# Patient Record
Sex: MALE | Race: WHITE | NOT HISPANIC OR LATINO | Employment: FULL TIME | URBAN - METROPOLITAN AREA
[De-identification: names, ages, dates, MRNs, and addresses within clinical notes are randomized per-mention and may not be internally consistent; named-entity substitution may affect disease eponyms.]

---

## 2017-09-24 ENCOUNTER — HOSPITAL ENCOUNTER (EMERGENCY)
Facility: HOSPITAL | Age: 37
Discharge: HOME | End: 2017-09-24
Attending: EMERGENCY MEDICINE | Admitting: EMERGENCY MEDICINE

## 2017-09-24 ENCOUNTER — APPOINTMENT (OUTPATIENT)
Dept: GENERAL RADIOLOGY | Facility: HOSPITAL | Age: 37
End: 2017-09-24

## 2017-09-24 VITALS
BODY MASS INDEX: 28.88 KG/M2 | OXYGEN SATURATION: 98 % | WEIGHT: 225 LBS | SYSTOLIC BLOOD PRESSURE: 114 MMHG | RESPIRATION RATE: 16 BRPM | HEIGHT: 74 IN | HEART RATE: 74 BPM | TEMPERATURE: 98.2 F | DIASTOLIC BLOOD PRESSURE: 74 MMHG

## 2017-09-24 DIAGNOSIS — S61.219A LACERATION OF FINGER, INITIAL ENCOUNTER: Primary | ICD-10-CM

## 2017-09-24 DIAGNOSIS — S62.639B OPEN FRACTURE OF TUFT OF DISTAL PHALANX OF FINGER, INITIAL ENCOUNTER: ICD-10-CM

## 2017-09-24 PROCEDURE — 73140 X-RAY EXAM OF FINGER(S): CPT

## 2017-09-24 PROCEDURE — 99283 EMERGENCY DEPT VISIT LOW MDM: CPT

## 2017-09-24 RX ORDER — HYDROCODONE BITARTRATE AND ACETAMINOPHEN 5; 325 MG/1; MG/1
1 TABLET ORAL EVERY 6 HOURS PRN
Qty: 12 TABLET | Refills: 0 | Status: SHIPPED | OUTPATIENT
Start: 2017-09-24

## 2017-09-24 RX ORDER — CEPHALEXIN 500 MG/1
500 CAPSULE ORAL 4 TIMES DAILY
Qty: 40 CAPSULE | Refills: 0 | Status: SHIPPED | OUTPATIENT
Start: 2017-09-24

## 2017-09-24 RX ORDER — BUPIVACAINE HYDROCHLORIDE 5 MG/ML
10 INJECTION, SOLUTION EPIDURAL; INTRACAUDAL ONCE
Status: COMPLETED | OUTPATIENT
Start: 2017-09-24 | End: 2017-09-24

## 2017-09-24 RX ORDER — LIDOCAINE HYDROCHLORIDE 10 MG/ML
10 INJECTION, SOLUTION EPIDURAL; INFILTRATION; INTRACAUDAL; PERINEURAL ONCE
Status: COMPLETED | OUTPATIENT
Start: 2017-09-24 | End: 2017-09-24

## 2017-09-24 RX ADMIN — LIDOCAINE HYDROCHLORIDE 10 ML: 10 INJECTION, SOLUTION EPIDURAL; INFILTRATION; INTRACAUDAL; PERINEURAL at 15:39

## 2017-09-24 RX ADMIN — BUPIVACAINE HYDROCHLORIDE 10 ML: 5 INJECTION, SOLUTION EPIDURAL; INTRACAUDAL at 15:39

## 2017-09-24 NOTE — ED PROVIDER NOTES
Subjective   HPI Comments: Patient is a 37-year-old white male that presents emergency Department complaints of a left third digit laceration.  Patient explains he had stopped the , and caught his digit tip on the blade.  Pt is right handed.  Pt is here with his spouse.  Pt advises that he is retired .   Pt has laceration through the 3rd digit nailbed, and posterior fingertip.  Pt c/o pain with movement and palp.  Pt has full ROM of the digit.  Pt tetanus is UTD.    Patient is a 37 y.o. male presenting with upper extremity pain.   History provided by:  Patient  Upper Extremity Issue   Upper extremity pain location: Lt 3rd finger laceration.  Injury: yes    Time since incident: Just PTA.  Mechanism of injury comment:  Cut digit on   Pain details:     Quality:  Throbbing    Severity:  Moderate    Timing:  Constant    Progression:  Worsening      Review of Systems   Musculoskeletal:        3rd middle finger laceration   Skin: Positive for wound.   All other systems reviewed and are negative.      Past Medical History:   Diagnosis Date   • PTSD (post-traumatic stress disorder)        No Known Allergies    Past Surgical History:   Procedure Laterality Date   • HERNIA REPAIR         History reviewed. No pertinent family history.    Social History     Social History   • Marital status:      Spouse name: N/A   • Number of children: N/A   • Years of education: N/A     Social History Main Topics   • Smoking status: Former Smoker   • Smokeless tobacco: Never Used   • Alcohol use No   • Drug use: No   • Sexual activity: Defer     Other Topics Concern   • None     Social History Narrative   • None           Objective   Physical Exam   Constitutional: He appears well-developed and well-nourished. He appears distressed (pt is uncomfortable, anxious at this time.).   HENT:   Head: Normocephalic and atraumatic.   Eyes: Conjunctivae are normal.   Neck: Normal range of motion.   Cardiovascular:  Normal rate, regular rhythm and normal heart sounds.    Pulmonary/Chest: Effort normal and breath sounds normal. No respiratory distress.   Musculoskeletal:        Hands:  Neurological: He is alert.   Skin: He is diaphoretic.   Nursing note and vitals reviewed.      Laceration Repair  Date/Time: 9/24/2017 11:44 PM  Performed by: MANE BEVERLY  Authorized by: NESTOR GUILLEN   Consent: Verbal consent obtained.  Risks and benefits: risks, benefits and alternatives were discussed  Consent given by: patient  Patient identity confirmed: verbally with patient  Location: 3rd finger tip laceration.  Laceration length: 3 cm  Tendon involvement: none  Nerve involvement: none  Vascular damage: no  Anesthesia: digital block    Anesthesia:  Local Anesthetic: bupivacaine 0.5% without epinephrine and lidocaine 1% without epinephrine    Sedation:  Patient sedated: no  Irrigation solution: saline  Irrigation method: syringe  Amount of cleaning: extensive  Skin closure: 5-0 Prolene  Number of sutures: 4  Technique: simple  Approximation: close  Approximation difficulty: simple  Dressing: splint and antibiotic ointment  Patient tolerance: Patient tolerated the procedure well with no immediate complications               ED Course  ED Course   Comment By Time   1730  patient tolerated the suturing well.  Finger is splinted at this time.  Patient will be discharged home.  Pt to watch for signs and symptoms of infection.  Pt to f/u with hand surgeon as discussed. Pt agrees and verb understanding. Mane RODRIGUEZ Timo, APRN 09/24 [id number]        No results found for this or any previous visit (from the past 24 hour(s)).  Note: In addition to lab results from this visit, the labs listed above may include labs taken at another facility or during a different encounter within the last 24 hours. Please correlate lab times with ED admission and discharge times for further clarification of the services performed during this visit.    XR Finger 2+  "View Left   Preliminary Result   Acute tuft fracture.       DICTATED:     09/24/2017   EDITED:         09/24/2017            Vitals:    09/24/17 1519 09/24/17 1750   BP: 114/74 114/74   Pulse: 72 74   Resp: 22 16   Temp: 97.9 °F (36.6 °C) 98.2 °F (36.8 °C)   SpO2: 99% 98%   Weight: 225 lb (102 kg)    Height: 74\" (188 cm)      Medications   lidocaine PF 1% (XYLOCAINE) injection 10 mL (10 mL Injection Given 9/24/17 1539)   bupivacaine (PF) (MARCAINE) 0.5 % injection 10 mL (10 mL Injection Given 9/24/17 1539)     ECG/EMG Results (last 24 hours)     ** No results found for the last 24 hours. **                  [facility]    Final diagnoses:   Laceration of finger, initial encounter   Open fracture of tuft of distal phalanx of finger, initial encounter            Mi C Timo, APRN  09/24/17 1927    "